# Patient Record
Sex: FEMALE | Race: WHITE | NOT HISPANIC OR LATINO
[De-identification: names, ages, dates, MRNs, and addresses within clinical notes are randomized per-mention and may not be internally consistent; named-entity substitution may affect disease eponyms.]

---

## 2020-05-08 ENCOUNTER — RESULT REVIEW (OUTPATIENT)
Age: 25
End: 2020-05-08

## 2020-07-28 PROBLEM — Z00.00 ENCOUNTER FOR PREVENTIVE HEALTH EXAMINATION: Status: ACTIVE | Noted: 2020-07-28

## 2020-07-30 ENCOUNTER — APPOINTMENT (OUTPATIENT)
Dept: ANTEPARTUM | Facility: CLINIC | Age: 25
End: 2020-07-30
Payer: OTHER GOVERNMENT

## 2020-07-30 ENCOUNTER — ASOB RESULT (OUTPATIENT)
Age: 25
End: 2020-07-30

## 2020-07-30 PROCEDURE — 76811 OB US DETAILED SNGL FETUS: CPT

## 2020-12-20 ENCOUNTER — OUTPATIENT (OUTPATIENT)
Dept: OUTPATIENT SERVICES | Facility: HOSPITAL | Age: 25
LOS: 1 days | End: 2020-12-20
Payer: OTHER GOVERNMENT

## 2020-12-20 DIAGNOSIS — Z20.828 CONTACT WITH AND (SUSPECTED) EXPOSURE TO OTHER VIRAL COMMUNICABLE DISEASES: ICD-10-CM

## 2020-12-20 LAB — SARS-COV-2 RNA SPEC QL NAA+PROBE: SIGNIFICANT CHANGE UP

## 2020-12-20 PROCEDURE — U0003: CPT

## 2020-12-21 ENCOUNTER — INPATIENT (INPATIENT)
Facility: HOSPITAL | Age: 25
LOS: 1 days | Discharge: HOME | End: 2020-12-23
Attending: STUDENT IN AN ORGANIZED HEALTH CARE EDUCATION/TRAINING PROGRAM | Admitting: STUDENT IN AN ORGANIZED HEALTH CARE EDUCATION/TRAINING PROGRAM
Payer: OTHER GOVERNMENT

## 2020-12-21 VITALS — DIASTOLIC BLOOD PRESSURE: 64 MMHG | SYSTOLIC BLOOD PRESSURE: 130 MMHG | HEART RATE: 91 BPM

## 2020-12-21 DIAGNOSIS — O26.899 OTHER SPECIFIED PREGNANCY RELATED CONDITIONS, UNSPECIFIED TRIMESTER: ICD-10-CM

## 2020-12-21 DIAGNOSIS — Z3A.00 WEEKS OF GESTATION OF PREGNANCY NOT SPECIFIED: ICD-10-CM

## 2020-12-21 DIAGNOSIS — Z34.80 ENCOUNTER FOR SUPERVISION OF OTHER NORMAL PREGNANCY, UNSPECIFIED TRIMESTER: ICD-10-CM

## 2020-12-21 LAB
BASOPHILS # BLD AUTO: 0.02 K/UL — SIGNIFICANT CHANGE UP (ref 0–0.2)
BASOPHILS NFR BLD AUTO: 0.2 % — SIGNIFICANT CHANGE UP (ref 0–2)
BLD GP AB SCN SERPL QL: NEGATIVE — SIGNIFICANT CHANGE UP
EOSINOPHIL # BLD AUTO: 0.13 K/UL — SIGNIFICANT CHANGE UP (ref 0–0.5)
EOSINOPHIL NFR BLD AUTO: 1.4 % — SIGNIFICANT CHANGE UP (ref 0–6)
HCT VFR BLD CALC: 37.3 % — SIGNIFICANT CHANGE UP (ref 34.5–45)
HGB BLD-MCNC: 12.4 G/DL — SIGNIFICANT CHANGE UP (ref 11.5–15.5)
IMM GRANULOCYTES NFR BLD AUTO: 0.4 % — SIGNIFICANT CHANGE UP (ref 0–1.5)
LYMPHOCYTES # BLD AUTO: 1.52 K/UL — SIGNIFICANT CHANGE UP (ref 1–3.3)
LYMPHOCYTES # BLD AUTO: 16 % — SIGNIFICANT CHANGE UP (ref 13–44)
MCHC RBC-ENTMCNC: 28.8 PG — SIGNIFICANT CHANGE UP (ref 27–34)
MCHC RBC-ENTMCNC: 33.2 GM/DL — SIGNIFICANT CHANGE UP (ref 32–36)
MCV RBC AUTO: 86.7 FL — SIGNIFICANT CHANGE UP (ref 80–100)
MONOCYTES # BLD AUTO: 0.55 K/UL — SIGNIFICANT CHANGE UP (ref 0–0.9)
MONOCYTES NFR BLD AUTO: 5.8 % — SIGNIFICANT CHANGE UP (ref 2–14)
NEUTROPHILS # BLD AUTO: 7.24 K/UL — SIGNIFICANT CHANGE UP (ref 1.8–7.4)
NEUTROPHILS NFR BLD AUTO: 76.2 % — SIGNIFICANT CHANGE UP (ref 43–77)
NRBC # BLD: 0 /100 WBCS — SIGNIFICANT CHANGE UP (ref 0–0)
PLATELET # BLD AUTO: 198 K/UL — SIGNIFICANT CHANGE UP (ref 150–400)
RBC # BLD: 4.3 M/UL — SIGNIFICANT CHANGE UP (ref 3.8–5.2)
RBC # FLD: 14 % — SIGNIFICANT CHANGE UP (ref 10.3–14.5)
RH IG SCN BLD-IMP: POSITIVE — SIGNIFICANT CHANGE UP
WBC # BLD: 9.5 K/UL — SIGNIFICANT CHANGE UP (ref 3.8–10.5)
WBC # FLD AUTO: 9.5 K/UL — SIGNIFICANT CHANGE UP (ref 3.8–10.5)

## 2020-12-21 RX ORDER — SODIUM CHLORIDE 9 MG/ML
1000 INJECTION, SOLUTION INTRAVENOUS
Refills: 0 | Status: DISCONTINUED | OUTPATIENT
Start: 2020-12-21 | End: 2020-12-22

## 2020-12-21 RX ORDER — OXYTOCIN 10 UNIT/ML
333.33 VIAL (ML) INJECTION
Qty: 20 | Refills: 0 | Status: DISCONTINUED | OUTPATIENT
Start: 2020-12-21 | End: 2020-12-22

## 2020-12-21 RX ORDER — ACETAMINOPHEN 500 MG
975 TABLET ORAL ONCE
Refills: 0 | Status: COMPLETED | OUTPATIENT
Start: 2020-12-21 | End: 2020-12-21

## 2020-12-21 RX ORDER — CITRIC ACID/SODIUM CITRATE 300-500 MG
15 SOLUTION, ORAL ORAL EVERY 6 HOURS
Refills: 0 | Status: DISCONTINUED | OUTPATIENT
Start: 2020-12-21 | End: 2020-12-22

## 2020-12-21 RX ADMIN — Medication 975 MILLIGRAM(S): at 20:27

## 2020-12-21 NOTE — OB PROVIDER H&P - HISTORY OF PRESENT ILLNESS
Pt. is a 24yo  @ 40w5d here from [facility] office for variables noted on NST. Pt. denies LOF, VB, CTX. Endorses +FM. PNC uncomplicated. GBS (-) EFW 3175g    OBHX: 2019: TAMANNA,FT, 6#12  Gynhx: denies fibroids, ovarian cyst, endometriosis, abnormal pap smears, STIs  Pmedhx: denies  Surghx: denies  Meds: PNV, Vit D  Allergies: NKDA  Psychhx: denies depression/anxiety/PPD  Sochx: denies ETOH, tobacco, illicit drug use

## 2020-12-21 NOTE — OB PROVIDER H&P - NSHPPHYSICALEXAM_GEN_ALL_CORE
Vital Signs Last 24 Hrs:    T(C): 37 (21 Dec 2020 18:42), Max: 37.0 (21 Dec 2020 18:36)  T(F): 98.6 (21 Dec 2020 18:42), Max: 98.6 (21 Dec 2020 18:36)  HR: 82 (21 Dec 2020 19:28) (80 - 91)  BP: 130/64 (21 Dec 2020 18:42) (130/64 - 130/64)  RR: 20 (21 Dec 2020 18:42) (20 - 20)  SpO2: 95% (21 Dec 2020 19:28) (94% - 100%)    Gen: NAD  CV: RRR  Pulm: CTAB  Abd: soft, non-tender, gravid  VE: 1/50/-3  Bedside sono: vertex  EFM: 135/mod dwight/+Accels/-decels  [place]: irregular q10min

## 2020-12-21 NOTE — OB PROVIDER H&P - ASSESSMENT
A/P: 24yo  @ 40w5d here for IOL  Cat II tracing  -Admit to L&D  -Routine admission labs   -EFM/toco: resuscitative measures prn  -PO Cytotec as IOL agent  -Cervical modi balloon at 2a; epidural 1st per pt request  -Anesthesia consult for epidural prn  -Anticipate

## 2020-12-22 LAB
SARS-COV-2 IGG SERPL QL IA: NEGATIVE — SIGNIFICANT CHANGE UP
SARS-COV-2 IGM SERPL IA-ACNC: <0.1 INDEX — SIGNIFICANT CHANGE UP
T PALLIDUM AB TITR SER: NEGATIVE — SIGNIFICANT CHANGE UP

## 2020-12-22 RX ORDER — AER TRAVELER 0.5 G/1
1 SOLUTION RECTAL; TOPICAL EVERY 4 HOURS
Refills: 0 | Status: DISCONTINUED | OUTPATIENT
Start: 2020-12-22 | End: 2020-12-23

## 2020-12-22 RX ORDER — ONDANSETRON 8 MG/1
4 TABLET, FILM COATED ORAL ONCE
Refills: 0 | Status: DISCONTINUED | OUTPATIENT
Start: 2020-12-22 | End: 2020-12-23

## 2020-12-22 RX ORDER — PRAMOXINE HYDROCHLORIDE 150 MG/15G
1 AEROSOL, FOAM RECTAL EVERY 4 HOURS
Refills: 0 | Status: DISCONTINUED | OUTPATIENT
Start: 2020-12-22 | End: 2020-12-23

## 2020-12-22 RX ORDER — OXYTOCIN 10 UNIT/ML
333.33 VIAL (ML) INJECTION
Qty: 20 | Refills: 0 | Status: DISCONTINUED | OUTPATIENT
Start: 2020-12-22 | End: 2020-12-23

## 2020-12-22 RX ORDER — OXYTOCIN 10 UNIT/ML
2 VIAL (ML) INJECTION
Qty: 30 | Refills: 0 | Status: DISCONTINUED | OUTPATIENT
Start: 2020-12-22 | End: 2020-12-22

## 2020-12-22 RX ORDER — ACETAMINOPHEN 500 MG
975 TABLET ORAL
Refills: 0 | Status: DISCONTINUED | OUTPATIENT
Start: 2020-12-22 | End: 2020-12-23

## 2020-12-22 RX ORDER — IBUPROFEN 200 MG
600 TABLET ORAL EVERY 6 HOURS
Refills: 0 | Status: COMPLETED | OUTPATIENT
Start: 2020-12-22 | End: 2021-11-20

## 2020-12-22 RX ORDER — DIPHENHYDRAMINE HCL 50 MG
25 CAPSULE ORAL EVERY 6 HOURS
Refills: 0 | Status: DISCONTINUED | OUTPATIENT
Start: 2020-12-22 | End: 2020-12-23

## 2020-12-22 RX ORDER — OXYCODONE HYDROCHLORIDE 5 MG/1
5 TABLET ORAL ONCE
Refills: 0 | Status: DISCONTINUED | OUTPATIENT
Start: 2020-12-22 | End: 2020-12-23

## 2020-12-22 RX ORDER — TETANUS TOXOID, REDUCED DIPHTHERIA TOXOID AND ACELLULAR PERTUSSIS VACCINE, ADSORBED 5; 2.5; 8; 8; 2.5 [IU]/.5ML; [IU]/.5ML; UG/.5ML; UG/.5ML; UG/.5ML
0.5 SUSPENSION INTRAMUSCULAR ONCE
Refills: 0 | Status: DISCONTINUED | OUTPATIENT
Start: 2020-12-22 | End: 2020-12-23

## 2020-12-22 RX ORDER — MAGNESIUM HYDROXIDE 400 MG/1
30 TABLET, CHEWABLE ORAL
Refills: 0 | Status: DISCONTINUED | OUTPATIENT
Start: 2020-12-22 | End: 2020-12-23

## 2020-12-22 RX ORDER — DIBUCAINE 1 %
1 OINTMENT (GRAM) RECTAL EVERY 6 HOURS
Refills: 0 | Status: DISCONTINUED | OUTPATIENT
Start: 2020-12-22 | End: 2020-12-23

## 2020-12-22 RX ORDER — BENZOCAINE 10 %
1 GEL (GRAM) MUCOUS MEMBRANE EVERY 6 HOURS
Refills: 0 | Status: DISCONTINUED | OUTPATIENT
Start: 2020-12-22 | End: 2020-12-23

## 2020-12-22 RX ORDER — OXYTOCIN 10 UNIT/ML
4 VIAL (ML) INJECTION
Qty: 30 | Refills: 0 | Status: DISCONTINUED | OUTPATIENT
Start: 2020-12-22 | End: 2020-12-22

## 2020-12-22 RX ORDER — LANOLIN
1 OINTMENT (GRAM) TOPICAL EVERY 6 HOURS
Refills: 0 | Status: DISCONTINUED | OUTPATIENT
Start: 2020-12-22 | End: 2020-12-23

## 2020-12-22 RX ORDER — SIMETHICONE 80 MG/1
80 TABLET, CHEWABLE ORAL EVERY 4 HOURS
Refills: 0 | Status: DISCONTINUED | OUTPATIENT
Start: 2020-12-22 | End: 2020-12-23

## 2020-12-22 RX ORDER — HYDROCORTISONE 1 %
1 OINTMENT (GRAM) TOPICAL EVERY 6 HOURS
Refills: 0 | Status: DISCONTINUED | OUTPATIENT
Start: 2020-12-22 | End: 2020-12-23

## 2020-12-22 RX ORDER — OXYCODONE HYDROCHLORIDE 5 MG/1
5 TABLET ORAL
Refills: 0 | Status: DISCONTINUED | OUTPATIENT
Start: 2020-12-22 | End: 2020-12-23

## 2020-12-22 RX ORDER — IBUPROFEN 200 MG
600 TABLET ORAL EVERY 6 HOURS
Refills: 0 | Status: DISCONTINUED | OUTPATIENT
Start: 2020-12-22 | End: 2020-12-23

## 2020-12-22 RX ORDER — KETOROLAC TROMETHAMINE 30 MG/ML
30 SYRINGE (ML) INJECTION ONCE
Refills: 0 | Status: DISCONTINUED | OUTPATIENT
Start: 2020-12-22 | End: 2020-12-23

## 2020-12-22 RX ORDER — SODIUM CHLORIDE 9 MG/ML
3 INJECTION INTRAMUSCULAR; INTRAVENOUS; SUBCUTANEOUS EVERY 8 HOURS
Refills: 0 | Status: DISCONTINUED | OUTPATIENT
Start: 2020-12-22 | End: 2020-12-23

## 2020-12-22 RX ADMIN — Medication 2 MILLIUNIT(S)/MIN: at 07:50

## 2020-12-22 RX ADMIN — Medication 975 MILLIGRAM(S): at 13:45

## 2020-12-22 RX ADMIN — Medication 600 MILLIGRAM(S): at 18:24

## 2020-12-22 RX ADMIN — Medication 600 MILLIGRAM(S): at 23:53

## 2020-12-22 RX ADMIN — SODIUM CHLORIDE 3 MILLILITER(S): 9 INJECTION INTRAMUSCULAR; INTRAVENOUS; SUBCUTANEOUS at 22:00

## 2020-12-22 RX ADMIN — Medication 600 MILLIGRAM(S): at 17:30

## 2020-12-22 RX ADMIN — Medication 975 MILLIGRAM(S): at 13:22

## 2020-12-22 RX ADMIN — Medication 975 MILLIGRAM(S): at 20:56

## 2020-12-22 RX ADMIN — Medication 975 MILLIGRAM(S): at 21:30

## 2020-12-22 NOTE — OB PROVIDER LABOR PROGRESS NOTE - ASSESSMENT
A/P:  -EFM/toco: resuscitative measures prn  -Start Pitocin at 4:30a for augmentation   -Anesthesia consult for epidural prn  -Anticipate 
CF REMOVED. AROM- CLEAR FLUID. CVX 4/80/-2. WILL AUGMENT WITH PITOCIN    COLETTE RUIZ
plan:  -expectant   d/w Dr. Martin.  RADU Vences

## 2020-12-22 NOTE — CHART NOTE - NSCHARTNOTEFT_GEN_A_CORE
PA/PP Bleeding Note    Called to see pt. for vaginal bleeding.  Pt. is s/p  of 2nd baby.  Pt. alert & oriented.  Denies lt. headedness.  Attempting to breastfeed.    Vital Signs Last 24 Hrs  T(C): 36.9 (22 Dec 2020 10:49), Max: 37.0 (21 Dec 2020 18:36)  T(F): 98.4 (22 Dec 2020 10:49), Max: 98.6 (21 Dec 2020 18:36)  HR: 74 (22 Dec 2020 11:45) (61 - 120)  BP: 110/62 (22 Dec 2020 11:41) (81/44 - 130/64)  RR: 18 (22 Dec 2020 10:49) (17 - 20)  SpO2: 99% (22 Dec 2020 11:45) (78% - 100%)    200 cc blood mixed with clots on chux.  Uterus with good tone that increaed with fundal massage.  VE:  no clots in vault or cervix.        ~ 50 cc blood with uterine massage.  Pt.given 1000 mcg rectal cytotec.  Reassurance given to pt.  Message left for Dr. Martin.    Adiel, PAMariumC PA/PP Bleeding Note    Called to see pt. for vaginal bleeding.  Pt. is s/p  of 2nd baby.  Pt. alert & oriented.  Denies lt. headedness.  Attempting to breastfeed.    Vital Signs Last 24 Hrs  T(C): 36.9 (22 Dec 2020 10:49), Max: 37.0 (21 Dec 2020 18:36)  T(F): 98.4 (22 Dec 2020 10:49), Max: 98.6 (21 Dec 2020 18:36)  HR: 74 (22 Dec 2020 11:45) (61 - 120)  BP: 110/62 (22 Dec 2020 11:41) (81/44 - 130/64)  RR: 18 (22 Dec 2020 10:49) (17 - 20)  SpO2: 99% (22 Dec 2020 11:45) (78% - 100%)    200 cc blood mixed with clots on chux.  Uterus with good tone that increaed with fundal massage.  VE:  no clots in vault or cervix.        ~ 50 cc blood with uterine massage.  Pt.given 1000 mcg rectal cytotec.  Reassurance given to pt.  Message left for Dr. Martin.    RADHA Chun    11:50 a.m Addendum:  Pt. noted to pass ~ 50 cc blood but good uterine tone noted.  Will cont. to observe.    Juan CALDERON PA-C

## 2020-12-22 NOTE — PROVIDER CONTACT NOTE (OTHER) - ASSESSMENT
Pt VSS and afebrile. Only complaint of rectal pressure of 1 from rectal cytotec.
Pt asymptomatic but states she is tired. VSS, FF@U with moderate clots (+250 to EBL=500 total now)
FF@2 belowU, pt asymptomatic. VSS

## 2020-12-22 NOTE — OB RN DELIVERY SUMMARY - NS_SEPSISRSKCALC_OBGYN_ALL_OB_FT
EOS calculated successfully. EOS Risk Factor: 0.5/1000 live births ([facility] national incidence); GA=40w6d; Temp=98.6; ROM=3.05; GBS='Negative'; Antibiotics='No antibiotics or any antibiotics < 2 hrs prior to birth'

## 2020-12-22 NOTE — OB PROVIDER LABOR PROGRESS NOTE - NS_SUBJECTIVE/OBJECTIVE_OBGYN_ALL_OB_FT
Pt. seen and examined for placement of cervical modi balloon. Pt. comfortable and does not desire an epidural at this time. Cervical modi balloon placed under sterile technique; 60cc NS instilled into uterine/vaginal balloon; pt. tolerated procedure well
PT S/P 4 DOSES CYTOTEC & CF. COMFORTABLE WITH EPI.
S: Pt seen and examined for increased pain and urge to push.   Pt with moderate bloody show.    O:  ICU Vital Signs Last 24 Hrs  T(C): 36.8 (22 Dec 2020 09:05), Max: 37.0 (21 Dec 2020 18:36)  T(F): 98.24 (22 Dec 2020 09:05), Max: 98.6 (21 Dec 2020 18:36)  HR: 99 (22 Dec 2020 10:00) (61 - 101)  BP: 94/55 (22 Dec 2020 09:49) (81/44 - 130/64)  RR: 18 (22 Dec 2020 09:05) (17 - 20)  SpO2: 98% (22 Dec 2020 10:00) (90% - 100%)  gen: moderate distress w/ctx, otherwise NAd  abd: soft, gravid.

## 2020-12-22 NOTE — PROVIDER CONTACT NOTE (OTHER) - REASON
On fundal check moderate bleeding with clots expressed
One clot expressed on fundal check
Update pts status

## 2020-12-22 NOTE — PROVIDER CONTACT NOTE (OTHER) - BACKGROUND
40.5 weeks  boy@1028,[id number],RML
 40.6 s/p  boy @ 1028, EBL total from delivery and recovery=550cc
 now @1028 boy, RML,  for delivery

## 2020-12-22 NOTE — PROVIDER CONTACT NOTE (OTHER) - SITUATION
Pt stable, FF2 below U after OOB with assist and voided 700cc. No more clots and bleeding moderate-light.
On fundal check fundus was firm however bleeding was moderate with clots.
On fundal check expressed 1 clot (+50cc to EBL= 550 total EBL now)

## 2020-12-23 ENCOUNTER — TRANSCRIPTION ENCOUNTER (OUTPATIENT)
Age: 25
End: 2020-12-23

## 2020-12-23 VITALS
TEMPERATURE: 98 F | DIASTOLIC BLOOD PRESSURE: 66 MMHG | SYSTOLIC BLOOD PRESSURE: 98 MMHG | HEART RATE: 86 BPM | RESPIRATION RATE: 18 BRPM | OXYGEN SATURATION: 97 %

## 2020-12-23 LAB
BASOPHILS # BLD AUTO: 0.03 K/UL — SIGNIFICANT CHANGE UP (ref 0–0.2)
BASOPHILS NFR BLD AUTO: 0.2 % — SIGNIFICANT CHANGE UP (ref 0–2)
EOSINOPHIL # BLD AUTO: 0.18 K/UL — SIGNIFICANT CHANGE UP (ref 0–0.5)
EOSINOPHIL NFR BLD AUTO: 1.4 % — SIGNIFICANT CHANGE UP (ref 0–6)
HCT VFR BLD CALC: 33.7 % — LOW (ref 34.5–45)
HGB BLD-MCNC: 11 G/DL — LOW (ref 11.5–15.5)
IMM GRANULOCYTES NFR BLD AUTO: 0.4 % — SIGNIFICANT CHANGE UP (ref 0–1.5)
LYMPHOCYTES # BLD AUTO: 1.61 K/UL — SIGNIFICANT CHANGE UP (ref 1–3.3)
LYMPHOCYTES # BLD AUTO: 12.9 % — LOW (ref 13–44)
MCHC RBC-ENTMCNC: 28.6 PG — SIGNIFICANT CHANGE UP (ref 27–34)
MCHC RBC-ENTMCNC: 32.6 GM/DL — SIGNIFICANT CHANGE UP (ref 32–36)
MCV RBC AUTO: 87.5 FL — SIGNIFICANT CHANGE UP (ref 80–100)
MONOCYTES # BLD AUTO: 0.57 K/UL — SIGNIFICANT CHANGE UP (ref 0–0.9)
MONOCYTES NFR BLD AUTO: 4.6 % — SIGNIFICANT CHANGE UP (ref 2–14)
NEUTROPHILS # BLD AUTO: 10.04 K/UL — HIGH (ref 1.8–7.4)
NEUTROPHILS NFR BLD AUTO: 80.5 % — HIGH (ref 43–77)
NRBC # BLD: 0 /100 WBCS — SIGNIFICANT CHANGE UP (ref 0–0)
PLATELET # BLD AUTO: 165 K/UL — SIGNIFICANT CHANGE UP (ref 150–400)
RBC # BLD: 3.85 M/UL — SIGNIFICANT CHANGE UP (ref 3.8–5.2)
RBC # FLD: 14.5 % — SIGNIFICANT CHANGE UP (ref 10.3–14.5)
WBC # BLD: 12.48 K/UL — HIGH (ref 3.8–10.5)
WBC # FLD AUTO: 12.48 K/UL — HIGH (ref 3.8–10.5)

## 2020-12-23 PROCEDURE — G0463: CPT

## 2020-12-23 PROCEDURE — 86850 RBC ANTIBODY SCREEN: CPT

## 2020-12-23 PROCEDURE — 86769 SARS-COV-2 COVID-19 ANTIBODY: CPT

## 2020-12-23 PROCEDURE — 59050 FETAL MONITOR W/REPORT: CPT

## 2020-12-23 PROCEDURE — 59025 FETAL NON-STRESS TEST: CPT

## 2020-12-23 PROCEDURE — 86900 BLOOD TYPING SEROLOGIC ABO: CPT

## 2020-12-23 PROCEDURE — 86780 TREPONEMA PALLIDUM: CPT

## 2020-12-23 PROCEDURE — 85025 COMPLETE CBC W/AUTO DIFF WBC: CPT

## 2020-12-23 PROCEDURE — 86901 BLOOD TYPING SEROLOGIC RH(D): CPT

## 2020-12-23 RX ORDER — ACETAMINOPHEN 500 MG
2 TABLET ORAL
Qty: 0 | Refills: 0 | DISCHARGE
Start: 2020-12-23

## 2020-12-23 RX ORDER — IBUPROFEN 200 MG
1 TABLET ORAL
Qty: 0 | Refills: 0 | DISCHARGE
Start: 2020-12-23

## 2020-12-23 RX ADMIN — Medication 600 MILLIGRAM(S): at 12:02

## 2020-12-23 RX ADMIN — Medication 600 MILLIGRAM(S): at 05:21

## 2020-12-23 RX ADMIN — Medication 600 MILLIGRAM(S): at 05:53

## 2020-12-23 RX ADMIN — Medication 975 MILLIGRAM(S): at 02:50

## 2020-12-23 RX ADMIN — Medication 975 MILLIGRAM(S): at 15:55

## 2020-12-23 RX ADMIN — Medication 975 MILLIGRAM(S): at 08:58

## 2020-12-23 RX ADMIN — Medication 1 TABLET(S): at 12:02

## 2020-12-23 RX ADMIN — Medication 600 MILLIGRAM(S): at 00:30

## 2020-12-23 RX ADMIN — Medication 975 MILLIGRAM(S): at 02:15

## 2020-12-23 RX ADMIN — Medication 600 MILLIGRAM(S): at 17:42

## 2020-12-23 RX ADMIN — SODIUM CHLORIDE 3 MILLILITER(S): 9 INJECTION INTRAMUSCULAR; INTRAVENOUS; SUBCUTANEOUS at 06:40

## 2020-12-23 NOTE — PROGRESS NOTE ADULT - SUBJECTIVE AND OBJECTIVE BOX
OB Progress Note:  PPD#1    S: 24yo  PPD#1 s/p . Pain is well controlled. She is tolerating a regular diet, voiding spontaneously, ambulating, and passing flatus. No headache, RUQ pain, or vision changes. Denies chest pain, SOB, lightheadedness, dizziness, n/v, fever/chills, or heavy vaginal bleeding. No other concerns.    O:  Vitals:  Vital Signs Last 24 Hrs  T(C): 36.4 (22 Dec 2020 22:41), Max: 37.2 (22 Dec 2020 12:50)  T(F): 97.6 (22 Dec 2020 22:41), Max: 99 (22 Dec 2020 12:50)  HR: 83 (22 Dec 2020 22:41) (61 - 120)  BP: 113/75 (22 Dec 2020 22:41) (81/44 - 128/64)  BP(mean): --  RR: 18 (22 Dec 2020 22:41) (16 - 18)  SpO2: 97% (22 Dec 2020 22:41) (78% - 100%)    Physical Exam:  General: NAD  Abdomen: soft, non-tender, non-distended, fundus firm  Extremities: No erythema/edema  Vaginal: lochia wnl    MEDICATIONS  (STANDING):  acetaminophen   Tablet .. 975 milliGRAM(s) Oral <User Schedule>  diphtheria/tetanus/pertussis (acellular) Vaccine (ADAcel) 0.5 milliLiter(s) IntraMuscular once  ibuprofen  Tablet. 600 milliGRAM(s) Oral every 6 hours  ketorolac   Injectable 30 milliGRAM(s) IV Push once  ondansetron    Tablet 4 milliGRAM(s) Oral once  oxytocin Infusion 333.333 milliUNIT(s)/Min (1000 mL/Hr) IV Continuous <Continuous>  prenatal multivitamin 1 Tablet(s) Oral daily  sodium chloride 0.9% lock flush 3 milliLiter(s) IV Push every 8 hours      Labs:  Blood type: O Positive  Rubella IgG: RPR: Negative                          12.4   9.50 >-----------< 198    ( -21 @ 20:43 )             37.3          A/P: 24yo PPD#1 s/p  with total 550cc EBL (250 intrapartum + 300 post partum) s/p [place] cytotec.  Patient is stable and doing well post-partum.   - f/u AM CBC  - Tylenol, motrin, prn oxycodone for pain control  - Continue regular diet  - Discharge planning    Karie Kelsey, PGY1
OB Attending Note    S: Patient doing well. Minimal lochia. Pain controlled.    O: Vital Signs Last 24 Hrs  T(C): 36.6 (23 Dec 2020 05:00), Max: 37.2 (22 Dec 2020 12:50)  T(F): 97.9 (23 Dec 2020 05:00), Max: 99 (22 Dec 2020 12:50)  HR: 86 (23 Dec 2020 05:00) (74 - 120)  BP: 111/70 (23 Dec 2020 05:00) (94/55 - 128/64)  RR: 18 (23 Dec 2020 05:00) (16 - 18)  SpO2: 98% (23 Dec 2020 05:00) (78% - 100%)    Gen: NAD  Abd: soft, NT, ND, fundus firm below umbilicus  Lochia: min  Perineum healing well  Ext: no tenderness    Labs:                        11.0   12.48 )-----------( 165      ( 23 Dec 2020 07:34 )             33.7       A: 25y PPD# 1 s/p  doing well.    Plan: cont PP care  OOB/ambulation  Pain control  Circ counseling done, risks and benefits reviewed, Risks include not limited to bleeding, infection, organ damage, permanent cosmetic changes and need for repeat circ

## 2020-12-23 NOTE — DISCHARGE NOTE OB - PATIENT PORTAL LINK FT
You can access the FollowMyHealth Patient Portal offered by [facility] by registering at the following website: [email or web address]. By joining [employer]’s FollowMyHealth portal, you will also be able to view your health information using other applications (apps) compatible with our system.

## 2020-12-23 NOTE — DISCHARGE NOTE OB - HOSPITAL COURSE
Pt admitted for IOL   -   Normal post partum course  VSS and afebrile  perineum healing well, min lochia  d/c home ppd#1  instructions given  f/u 6 weeks

## 2020-12-23 NOTE — DISCHARGE NOTE OB - CARE PLAN
Principal Discharge DX:	 (normal spontaneous vaginal delivery)  Goal:	pain control  Assessment and plan of treatment:	ambulation, nothing per vagina

## 2020-12-23 NOTE — DISCHARGE NOTE OB - CARE PROVIDER_API CALL
Kendra Novak  OBSTETRICS AND GYNECOLOGY  [address], [address]  [place] [address]  Phone: [phone]  Fax: [phone]  Follow Up Time:

## 2020-12-23 NOTE — DISCHARGE NOTE OB - MEDICATION SUMMARY - MEDICATIONS TO TAKE
I will START or STAY ON the medications listed below when I get home from the hospital:    acetaminophen 325 mg oral tablet  -- 2 tab(s) by mouth every 6 hours  -- Indication: For  (normal spontaneous vaginal delivery)    ibuprofen 600 mg oral tablet  -- 1 tab(s) by mouth every 6 hours  -- Indication: For  (normal spontaneous vaginal delivery)    Prenatal Multivitamins oral tablet  -- Indication: For  (normal spontaneous vaginal delivery)

## 2021-03-19 ENCOUNTER — TRANSCRIPTION ENCOUNTER (OUTPATIENT)
Age: 26
End: 2021-03-19

## 2021-04-27 ENCOUNTER — TRANSCRIPTION ENCOUNTER (OUTPATIENT)
Age: 26
End: 2021-04-27

## 2021-05-05 ENCOUNTER — TRANSCRIPTION ENCOUNTER (OUTPATIENT)
Age: 26
End: 2021-05-05

## 2021-05-05 NOTE — DISCHARGE NOTE OB - MEDICATION SUMMARY - MEDICATIONS TO CHANGE
----- Message from Venecia Perez MD sent at 5/5/2021  7:46 AM CDT -----  Neg covid   I will SWITCH the dose or number of times a day I take the medications listed below when I get home from the hospital:  None

## 2021-05-11 ENCOUNTER — TRANSCRIPTION ENCOUNTER (OUTPATIENT)
Age: 26
End: 2021-05-11

## 2022-03-16 ENCOUNTER — RESULT REVIEW (OUTPATIENT)
Age: 27
End: 2022-03-16